# Patient Record
Sex: MALE | Race: BLACK OR AFRICAN AMERICAN | NOT HISPANIC OR LATINO | URBAN - METROPOLITAN AREA
[De-identification: names, ages, dates, MRNs, and addresses within clinical notes are randomized per-mention and may not be internally consistent; named-entity substitution may affect disease eponyms.]

---

## 2018-05-22 ENCOUNTER — OUTPATIENT (OUTPATIENT)
Dept: OUTPATIENT SERVICES | Facility: HOSPITAL | Age: 83
LOS: 1 days | End: 2018-05-22
Payer: MEDICARE

## 2018-05-22 VITALS
TEMPERATURE: 97 F | SYSTOLIC BLOOD PRESSURE: 172 MMHG | HEIGHT: 66 IN | DIASTOLIC BLOOD PRESSURE: 82 MMHG | RESPIRATION RATE: 14 BRPM | WEIGHT: 164.02 LBS | HEART RATE: 79 BPM

## 2018-05-22 DIAGNOSIS — K40.90 UNILATERAL INGUINAL HERNIA, WITHOUT OBSTRUCTION OR GANGRENE, NOT SPECIFIED AS RECURRENT: ICD-10-CM

## 2018-05-22 DIAGNOSIS — Z96.649 PRESENCE OF UNSPECIFIED ARTIFICIAL HIP JOINT: Chronic | ICD-10-CM

## 2018-05-22 LAB
BUN SERPL-MCNC: 20 MG/DL — SIGNIFICANT CHANGE UP (ref 7–23)
CALCIUM SERPL-MCNC: 8.7 MG/DL — SIGNIFICANT CHANGE UP (ref 8.4–10.5)
CHLORIDE SERPL-SCNC: 100 MMOL/L — SIGNIFICANT CHANGE UP (ref 98–107)
CO2 SERPL-SCNC: 22 MMOL/L — SIGNIFICANT CHANGE UP (ref 22–31)
CREAT SERPL-MCNC: 1.73 MG/DL — HIGH (ref 0.5–1.3)
GLUCOSE SERPL-MCNC: 88 MG/DL — SIGNIFICANT CHANGE UP (ref 70–99)
HCT VFR BLD CALC: 31.2 % — LOW (ref 39–50)
HGB BLD-MCNC: 9 G/DL — LOW (ref 13–17)
MCHC RBC-ENTMCNC: 18.6 PG — LOW (ref 27–34)
MCHC RBC-ENTMCNC: 28.8 % — LOW (ref 32–36)
MCV RBC AUTO: 64.3 FL — LOW (ref 80–100)
NRBC # FLD: 0.02 — SIGNIFICANT CHANGE UP
PLATELET # BLD AUTO: 401 K/UL — HIGH (ref 150–400)
PMV BLD: 9.2 FL — SIGNIFICANT CHANGE UP (ref 7–13)
POTASSIUM SERPL-MCNC: 4.7 MMOL/L — SIGNIFICANT CHANGE UP (ref 3.5–5.3)
POTASSIUM SERPL-SCNC: 4.7 MMOL/L — SIGNIFICANT CHANGE UP (ref 3.5–5.3)
RBC # BLD: 4.85 M/UL — SIGNIFICANT CHANGE UP (ref 4.2–5.8)
RBC # FLD: 18.8 % — HIGH (ref 10.3–14.5)
SODIUM SERPL-SCNC: 135 MMOL/L — SIGNIFICANT CHANGE UP (ref 135–145)
WBC # BLD: 6.3 K/UL — SIGNIFICANT CHANGE UP (ref 3.8–10.5)
WBC # FLD AUTO: 6.3 K/UL — SIGNIFICANT CHANGE UP (ref 3.8–10.5)

## 2018-05-22 PROCEDURE — 93010 ELECTROCARDIOGRAM REPORT: CPT

## 2018-05-22 NOTE — H&P PST ADULT - NEGATIVE CARDIOVASCULAR SYMPTOMS
no dyspnea on exertion/no orthopnea/no paroxysmal nocturnal dyspnea/no peripheral edema/no chest pain/no palpitations/no claudication

## 2018-05-22 NOTE — H&P PST ADULT - PROBLEM SELECTOR PLAN 1
Pt scheduled for right inguinal scrotal hernia repair with mesh on 5/31/2018.  labs done results pending, ekg done.  Pt with multiple comorbidities, went for medical evaluation with request.  Preop teaching done, pt able to verbalize understanding.

## 2018-05-22 NOTE — H&P PST ADULT - GASTROINTESTINAL DETAILS
no bruit/no rebound tenderness/no rigidity/no guarding/bowel sounds normal/nontender/no organomegaly/soft

## 2018-05-22 NOTE — H&P PST ADULT - RS GEN PE MLT RESP DETAILS PC
clear to auscultation bilaterally/respirations non-labored/breath sounds equal/good air movement/no chest wall tenderness/no intercostal retractions/no rales/no wheezes/no rhonchi

## 2018-05-22 NOTE — H&P PST ADULT - HISTORY OF PRESENT ILLNESS
82y/o male scheduled for right inguinal scrotal hernia repair with mesh on 5/31/2018.  Pt states, "right inguinal hernia for the past year, denies pain."

## 2018-05-22 NOTE — H&P PST ADULT - NEGATIVE GENERAL GENITOURINARY SYMPTOMS
no flank pain L/no dysuria/no hematuria/no flank pain R/no bladder infections/no urinary hesitancy/normal urinary frequency

## 2018-05-22 NOTE — H&P PST ADULT - NSANTHOSAYNRD_GEN_A_CORE
No. RASHID screening performed.  STOP BANG Legend: 0-2 = LOW Risk; 3-4 = INTERMEDIATE Risk; 5-8 = HIGH Risk

## 2018-05-22 NOTE — H&P PST ADULT - NEGATIVE GENERAL SYMPTOMS
no fever/no polyphagia/no fatigue/no sweating/no chills/no polydipsia/no anorexia/no weight loss/no weight gain/no polyuria/no malaise

## 2018-05-22 NOTE — H&P PST ADULT - NEGATIVE BREAST SYMPTOMS
no nipple discharge R/no breast lump R/no nipple discharge L/no breast tenderness L/no breast tenderness R/no breast lump L

## 2018-05-31 ENCOUNTER — OUTPATIENT (OUTPATIENT)
Dept: OUTPATIENT SERVICES | Facility: HOSPITAL | Age: 83
LOS: 1 days | Discharge: HOME | End: 2018-05-31
Payer: MEDICARE

## 2018-05-31 VITALS
DIASTOLIC BLOOD PRESSURE: 75 MMHG | TEMPERATURE: 99 F | HEART RATE: 59 BPM | OXYGEN SATURATION: 99 % | SYSTOLIC BLOOD PRESSURE: 157 MMHG | RESPIRATION RATE: 16 BRPM

## 2018-05-31 VITALS
RESPIRATION RATE: 18 BRPM | SYSTOLIC BLOOD PRESSURE: 159 MMHG | DIASTOLIC BLOOD PRESSURE: 87 MMHG | OXYGEN SATURATION: 100 % | WEIGHT: 164.02 LBS | TEMPERATURE: 99 F | HEART RATE: 62 BPM

## 2018-05-31 DIAGNOSIS — K40.90 UNILATERAL INGUINAL HERNIA, WITHOUT OBSTRUCTION OR GANGRENE, NOT SPECIFIED AS RECURRENT: ICD-10-CM

## 2018-05-31 DIAGNOSIS — Z96.649 PRESENCE OF UNSPECIFIED ARTIFICIAL HIP JOINT: Chronic | ICD-10-CM

## 2018-05-31 PROCEDURE — 88302 TISSUE EXAM BY PATHOLOGIST: CPT | Mod: 26

## 2018-05-31 RX ORDER — SODIUM CHLORIDE 9 MG/ML
1000 INJECTION, SOLUTION INTRAVENOUS
Qty: 0 | Refills: 0 | Status: DISCONTINUED | OUTPATIENT
Start: 2018-05-31 | End: 2018-06-01

## 2018-05-31 NOTE — ASU DISCHARGE PLAN (ADULT/PEDIATRIC). - NURSING INSTRUCTIONS
You were given 1000mg IV Tylenol for pain management.  Please DO NOT take Tylenol, Vicodin or Percocet for the next 6 hours (until 3 pm ).  DO NOT EXCEED 3000MG OF TYLENOL OVER 24 HOURS.

## 2018-05-31 NOTE — ASU DISCHARGE PLAN (ADULT/PEDIATRIC). - MEDICATION SUMMARY - MEDICATIONS TO TAKE
I will START or STAY ON the medications listed below when I get home from the hospital:    Tylenol 500 mg oral tablet  -- 2 tab(s) by mouth every 6 hours, As Needed  -- Indication: For Unilateral inguinal hernia without obstruction or gangrene    ibuprofen 800 mg oral tablet  -- 1 tab(s) by mouth once a day last dose 5/22/2018  -- Indication: For Unilateral inguinal hernia without obstruction or gangrene    aspirin 81 mg oral tablet  -- 1 tab(s) by mouth once a day  -- Indication: For Unilateral inguinal hernia without obstruction or gangrene    valsartan 320 mg oral tablet  -- 1 tab(s) by mouth once a day am  -- Indication: For Unilateral inguinal hernia without obstruction or gangrene    Metoprolol Tartrate 100 mg oral tablet  -- 1 tab(s) by mouth 2 times a day  -- Indication: For Unilateral inguinal hernia without obstruction or gangrene    omeprazole 20 mg oral delayed release tablet  -- 1 tab(s) by mouth once a day am  -- Indication: For Unilateral inguinal hernia without obstruction or gangrene

## 2018-05-31 NOTE — ASU DISCHARGE PLAN (ADULT/PEDIATRIC). - NOTIFY
Numbness, color, or temperature change to extremity/Bleeding that does not stop/Fever greater than 101/Pain not relieved by Medications Pain not relieved by Medications/Fever greater than 101/Numbness, color, or temperature change to extremity/Inability to Tolerate Liquids or Foods/Bleeding that does not stop/Persistent Nausea and Vomiting/Unable to Urinate

## 2018-05-31 NOTE — BRIEF OPERATIVE NOTE - PROCEDURE
<<-----Click on this checkbox to enter Procedure Inguinal hernia repair, right  05/31/2018  with mesh, underlay  Active  [id number]

## 2018-06-05 LAB — SURGICAL PATHOLOGY STUDY: SIGNIFICANT CHANGE UP

## 2020-02-18 ENCOUNTER — EMERGENCY (EMERGENCY)
Facility: HOSPITAL | Age: 85
LOS: 1 days | Discharge: HOME | End: 2020-02-18
Attending: EMERGENCY MEDICINE | Admitting: EMERGENCY MEDICINE
Payer: MEDICARE

## 2020-02-18 VITALS
RESPIRATION RATE: 18 BRPM | SYSTOLIC BLOOD PRESSURE: 165 MMHG | TEMPERATURE: 98 F | OXYGEN SATURATION: 100 % | DIASTOLIC BLOOD PRESSURE: 93 MMHG | HEART RATE: 97 BPM

## 2020-02-18 DIAGNOSIS — Z96.649 PRESENCE OF UNSPECIFIED ARTIFICIAL HIP JOINT: Chronic | ICD-10-CM

## 2020-02-18 LAB
ALBUMIN SERPL ELPH-MCNC: 3.8 G/DL — SIGNIFICANT CHANGE UP (ref 3.3–5)
ALP SERPL-CCNC: 44 U/L — SIGNIFICANT CHANGE UP (ref 40–120)
ALT FLD-CCNC: 7 U/L — SIGNIFICANT CHANGE UP (ref 4–41)
ANION GAP SERPL CALC-SCNC: 12 MMO/L — SIGNIFICANT CHANGE UP (ref 7–14)
ANISOCYTOSIS BLD QL: SIGNIFICANT CHANGE UP
APPEARANCE UR: CLEAR — SIGNIFICANT CHANGE UP
AST SERPL-CCNC: 35 U/L — SIGNIFICANT CHANGE UP (ref 4–40)
BASOPHILS # BLD AUTO: 0.02 K/UL — SIGNIFICANT CHANGE UP (ref 0–0.2)
BASOPHILS NFR BLD AUTO: 0.2 % — SIGNIFICANT CHANGE UP (ref 0–2)
BASOPHILS NFR SPEC: 0 % — SIGNIFICANT CHANGE UP (ref 0–2)
BILIRUB SERPL-MCNC: 0.4 MG/DL — SIGNIFICANT CHANGE UP (ref 0.2–1.2)
BILIRUB UR-MCNC: NEGATIVE — SIGNIFICANT CHANGE UP
BLASTS # FLD: 0 % — SIGNIFICANT CHANGE UP (ref 0–0)
BLD GP AB SCN SERPL QL: NEGATIVE — SIGNIFICANT CHANGE UP
BLOOD UR QL VISUAL: NEGATIVE — SIGNIFICANT CHANGE UP
BUN SERPL-MCNC: 18 MG/DL — SIGNIFICANT CHANGE UP (ref 7–23)
CALCIUM SERPL-MCNC: 9.1 MG/DL — SIGNIFICANT CHANGE UP (ref 8.4–10.5)
CHLORIDE SERPL-SCNC: 98 MMOL/L — SIGNIFICANT CHANGE UP (ref 98–107)
CO2 SERPL-SCNC: 21 MMOL/L — LOW (ref 22–31)
COLOR SPEC: YELLOW — SIGNIFICANT CHANGE UP
CREAT SERPL-MCNC: 1.51 MG/DL — HIGH (ref 0.5–1.3)
EOSINOPHIL # BLD AUTO: 0.18 K/UL — SIGNIFICANT CHANGE UP (ref 0–0.5)
EOSINOPHIL NFR BLD AUTO: 2.2 % — SIGNIFICANT CHANGE UP (ref 0–6)
EOSINOPHIL NFR FLD: 2.6 % — SIGNIFICANT CHANGE UP (ref 0–6)
GIANT PLATELETS BLD QL SMEAR: PRESENT — SIGNIFICANT CHANGE UP
GLUCOSE SERPL-MCNC: 100 MG/DL — HIGH (ref 70–99)
GLUCOSE UR-MCNC: NEGATIVE — SIGNIFICANT CHANGE UP
HCT VFR BLD CALC: 25.3 % — LOW (ref 39–50)
HGB BLD-MCNC: 7 G/DL — CRITICAL LOW (ref 13–17)
HYPOCHROMIA BLD QL: SIGNIFICANT CHANGE UP
IMM GRANULOCYTES NFR BLD AUTO: 0.4 % — SIGNIFICANT CHANGE UP (ref 0–1.5)
KETONES UR-MCNC: NEGATIVE — SIGNIFICANT CHANGE UP
LEUKOCYTE ESTERASE UR-ACNC: NEGATIVE — SIGNIFICANT CHANGE UP
LIDOCAIN IGE QN: 40.3 U/L — SIGNIFICANT CHANGE UP (ref 7–60)
LYMPHOCYTES # BLD AUTO: 1.84 K/UL — SIGNIFICANT CHANGE UP (ref 1–3.3)
LYMPHOCYTES # BLD AUTO: 22.3 % — SIGNIFICANT CHANGE UP (ref 13–44)
LYMPHOCYTES NFR SPEC AUTO: 20.7 % — SIGNIFICANT CHANGE UP (ref 13–44)
MACROCYTES BLD QL: SLIGHT — SIGNIFICANT CHANGE UP
MCHC RBC-ENTMCNC: 15.8 PG — LOW (ref 27–34)
MCHC RBC-ENTMCNC: 27.7 % — LOW (ref 32–36)
MCV RBC AUTO: 57.2 FL — LOW (ref 80–100)
METAMYELOCYTES # FLD: 0 % — SIGNIFICANT CHANGE UP (ref 0–1)
MICROCYTES BLD QL: SIGNIFICANT CHANGE UP
MONOCYTES # BLD AUTO: 1.23 K/UL — HIGH (ref 0–0.9)
MONOCYTES NFR BLD AUTO: 14.9 % — HIGH (ref 2–14)
MONOCYTES NFR BLD: 10.3 % — HIGH (ref 2–9)
MYELOCYTES NFR BLD: 0 % — SIGNIFICANT CHANGE UP (ref 0–0)
NEUTROPHIL AB SER-ACNC: 66.4 % — SIGNIFICANT CHANGE UP (ref 43–77)
NEUTROPHILS # BLD AUTO: 4.94 K/UL — SIGNIFICANT CHANGE UP (ref 1.8–7.4)
NEUTROPHILS NFR BLD AUTO: 60 % — SIGNIFICANT CHANGE UP (ref 43–77)
NEUTS BAND # BLD: 0 % — SIGNIFICANT CHANGE UP (ref 0–6)
NITRITE UR-MCNC: NEGATIVE — SIGNIFICANT CHANGE UP
NRBC # BLD: 1 /100WBC — SIGNIFICANT CHANGE UP
NRBC # FLD: 0.03 K/UL — SIGNIFICANT CHANGE UP (ref 0–0)
NT-PROBNP SERPL-SCNC: 201.4 PG/ML — SIGNIFICANT CHANGE UP
OB PNL STL: NEGATIVE — SIGNIFICANT CHANGE UP
OTHER - HEMATOLOGY %: 0 — SIGNIFICANT CHANGE UP
PH UR: 7.5 — SIGNIFICANT CHANGE UP (ref 5–8)
PLATELET # BLD AUTO: 515 K/UL — HIGH (ref 150–400)
PLATELET COUNT - ESTIMATE: SIGNIFICANT CHANGE UP
PMV BLD: 9 FL — SIGNIFICANT CHANGE UP (ref 7–13)
POIKILOCYTOSIS BLD QL AUTO: SIGNIFICANT CHANGE UP
POLYCHROMASIA BLD QL SMEAR: SLIGHT — SIGNIFICANT CHANGE UP
POTASSIUM SERPL-MCNC: 5.3 MMOL/L — SIGNIFICANT CHANGE UP (ref 3.5–5.3)
POTASSIUM SERPL-SCNC: 5.3 MMOL/L — SIGNIFICANT CHANGE UP (ref 3.5–5.3)
PROMYELOCYTES # FLD: 0 % — SIGNIFICANT CHANGE UP (ref 0–0)
PROT SERPL-MCNC: 7.7 G/DL — SIGNIFICANT CHANGE UP (ref 6–8.3)
PROT UR-MCNC: 10 — SIGNIFICANT CHANGE UP
RBC # BLD: 4.42 M/UL — SIGNIFICANT CHANGE UP (ref 4.2–5.8)
RBC # FLD: 22.5 % — HIGH (ref 10.3–14.5)
RH IG SCN BLD-IMP: POSITIVE — SIGNIFICANT CHANGE UP
RH IG SCN BLD-IMP: POSITIVE — SIGNIFICANT CHANGE UP
SCHISTOCYTES BLD QL AUTO: SLIGHT — SIGNIFICANT CHANGE UP
SODIUM SERPL-SCNC: 131 MMOL/L — LOW (ref 135–145)
SP GR SPEC: 1.01 — SIGNIFICANT CHANGE UP (ref 1–1.04)
TARGETS BLD QL SMEAR: SIGNIFICANT CHANGE UP
TROPONIN T, HIGH SENSITIVITY: 15 NG/L — SIGNIFICANT CHANGE UP (ref ?–14)
TROPONIN T, HIGH SENSITIVITY: 17 NG/L — SIGNIFICANT CHANGE UP (ref ?–14)
UROBILINOGEN FLD QL: NORMAL — SIGNIFICANT CHANGE UP
VARIANT LYMPHS # BLD: 0 % — SIGNIFICANT CHANGE UP
WBC # BLD: 8.24 K/UL — SIGNIFICANT CHANGE UP (ref 3.8–10.5)
WBC # FLD AUTO: 8.24 K/UL — SIGNIFICANT CHANGE UP (ref 3.8–10.5)

## 2020-02-18 PROCEDURE — 71046 X-RAY EXAM CHEST 2 VIEWS: CPT | Mod: 26

## 2020-02-18 PROCEDURE — 99218: CPT | Mod: GC

## 2020-02-18 PROCEDURE — ZZZZZ: CPT

## 2020-02-18 RX ORDER — FAMOTIDINE 10 MG/ML
20 INJECTION INTRAVENOUS ONCE
Refills: 0 | Status: COMPLETED | OUTPATIENT
Start: 2020-02-18 | End: 2020-02-18

## 2020-02-18 RX ADMIN — FAMOTIDINE 20 MILLIGRAM(S): 10 INJECTION INTRAVENOUS at 20:53

## 2020-02-18 NOTE — ED PROVIDER NOTE - OBJECTIVE STATEMENT
84 y/o male hx GERD HTN presents to ER w/ multiple complaints. Pt. states for the past 2 months has been experiencing chest pain/burning and difficulty swallowing - states he has been off his omeprazole for the past 2-3 months and thinks this is contributing to his symptoms- states he has difficulty swallowing solids but able to tolerate liquids. also admits to intermittetn cough as well. Deneis fever chills weakness dizziness nausea vomit.

## 2020-02-18 NOTE — ED CDU PROVIDER INITIAL DAY NOTE - OBJECTIVE STATEMENT
HPI: Patient is a 85 y.o male with Pmhx of HTN, GERD who presents to ED c/o vague chest discomfort described as burning sensation and feeling of SOB today s/p walking home from bus. As per patient and daughter at bedside states that over past month or so he has been having intermittent burning sensation of chest, a/w eating. Over past few days though noted some generalized weakness and sob. However symptoms worse today, states he went out to store and after walking home from bus he was very sob prompting them to come to ED.  Pt noted to be anemic in ED, HGB 7.0. Pt has never required transfusion in past but states was told recently he was anemic and needed to follow up with a hematologist. Pt labs otherwise wnl. Trops stable/neg x 2. CXR normal. Pt Transferred to CDU for 2 units PRBC, vitals q4 and frequent re-evals. Pt denies recent travel, melena, hematuria, fevers, chills, back pain, LE swelling or any other complaints. Not on blood thinners.

## 2020-02-18 NOTE — ED PROVIDER NOTE - PROGRESS NOTE DETAILS
patient initially presneted with ?exertional cp. given low hgb, will transfuse to target of 9. patient well appearing with vss and in no distress. will also try to get heme consult while in CDU

## 2020-02-18 NOTE — ED CDU PROVIDER INITIAL DAY NOTE - ATTENDING CONTRIBUTION TO CARE
I performed a face to face evaluation of this patient and obtained a history and performed a full exam.  I agree with the history, physical exam and plan of the PA. Exam unremarkable.

## 2020-02-18 NOTE — ED CDU PROVIDER INITIAL DAY NOTE - PHYSICAL EXAMINATION
Vital signs reviewed.   CONSTITUTIONAL: Well-appearing; well-nourished; in no apparent distress. Non-toxic appearing.   HEAD: Normocephalic, atraumatic.  EYES: PERRL, EOM intact, conjunctiva and sclera WNL.  ENT: normal nose; no rhinorrhea;  NECK/LYMPH: Supple; non-tender  CARD: Normal S1, S2; no murmurs, rubs, or gallops noted.  RESP: Normal chest excursion with respiration; breath sounds clear and equal bilaterally; no wheezes, rhonchi, or rales.  EXT/MS: moves all extremities;  no pedal edema.  SKIN: Normal for age and race; warm; dry; good turgor; no apparent lesions or exudate noted.  NEURO: Awake, alert, oriented x 3, no gross deficits  PSYCH: Normal mood; appropriate affect.

## 2020-02-18 NOTE — ED ADULT NURSE NOTE - OBJECTIVE STATEMENT
85 year old male with PMH of GERD and HTN presents with chest discomfort x several days with SOB  Pt is alert and oriented x4, [place] speaking, daughter at the bedside helping to translate with the history. Cardiac monitor on SR noted PIV placed labs drawn and sent as ordered plan of care discussed with pt and daughter will continue to monitor closely

## 2020-02-18 NOTE — ED ADULT NURSE NOTE - NSIMPLEMENTINTERV_GEN_ALL_ED
Implemented All Fall with Harm Risk Interventions:  [place] to call system. Call bell, personal items and telephone within reach. Instruct patient to call for assistance. Room bathroom lighting operational. Non-slip footwear when patient is off stretcher. Physically safe environment: no spills, clutter or unnecessary equipment. Stretcher in lowest position, wheels locked, appropriate side rails in place. Provide visual cue, wrist band, yellow gown, etc. Monitor gait and stability. Monitor for mental status changes and reorient to person, place, and time. Review medications for side effects contributing to fall risk. Reinforce activity limits and safety measures with patient and family. Provide visual clues: red socks.

## 2020-02-18 NOTE — ED ADULT NURSE NOTE - CHPI ED NUR SYMPTOMS NEG
no chills/no dizziness/no diaphoresis/no nausea/no back pain/no congestion/no fever/no vomiting/no syncope

## 2020-02-18 NOTE — ED PROVIDER NOTE - ATTENDING CONTRIBUTION TO CARE
I have personally performed a face to face bedside history and physical examination of this patient. I have discussed the history, examination, review of systems, assessment and plan of management with the resident. I have reviewed the electronic medical record and amended it to reflect my history, review of systems, physical exam, assessment and plan.    84 y/o male hx GERD HTN presents to ER w/ multiple complaints. Pt. states for the past 2 months has been experiencing chest pain/burning and difficulty swallowing - states he has been off his omeprazole for the past 2-3 months and thinks this is contributing to his symptoms- states he has difficulty swallowing solids but able to tolerate liquids. also admits to intermittetn cough as well. Deneis fever chills weakness dizziness nausea vomit. VSS AF.  Exam non-toxic appearing, neuro exam non-focal, lungs clear, heart sounds nl, abdomen soft and nt.  Low c/f acute surgical abd pathology.  No clear bacterial infectious etiology.  Possible gerd given patient off ppi.  Will send labs, supportive care.  Disposition pending.

## 2020-02-18 NOTE — ED PROVIDER NOTE - CLINICAL SUMMARY MEDICAL DECISION MAKING FREE TEXT BOX
CHAI:  86 y/o male hx GERD HTN presents to ER w/ multiple complaints. Pt. states for the past 2 months has been experiencing chest pain/burning and difficulty swallowing - states he has been off his omeprazole for the past 2-3 months and thinks this is contributing to his symptoms- states he has difficulty swallowing solids but able to tolerate liquids. also admits to intermittetn cough as well. Deneis fever chills weakness dizziness nausea vomit. VSS AF.  Exam non-toxic appearing, neuro exam non-focal, lungs clear, heart sounds nl, abdomen soft and nt.  Low c/f acute surgical abd pathology.  No clear bacterial infectious etiology.  Possible gerd given patient off ppi.  Will send labs, supportive care.  Disposition pending.

## 2020-02-18 NOTE — ED CDU PROVIDER INITIAL DAY NOTE - MEDICAL DECISION MAKING DETAILS
Patient is a 85 y.o male with Pmhx of HTN, GERD who presents to ED c/o vague chest discomfort described as burning sensation and feeling of SOB today s/p walking home from bus. Pt noted to be anemic in ED, HGB 7.0. Pt has never required transfusion in past. Transferred to CDU for 2 units PRBC, vitals q4 and frequent re-evals.

## 2020-02-18 NOTE — ED CDU PROVIDER INITIAL DAY NOTE - DETAILS
Patient is a 85 y.o male with Pmhx of HTN, GERD who presents to ED c/o vague chest discomfort described as burning sensation and feeling of SOB today s/p walking home from bus. Pt noted to be anemic in ED, HGB 7.0. Pt has never required transfusion in past. Transferred to CDU for 2 units PRBC Patient is a 85 y.o male with Pmhx of HTN, GERD who presents to ED c/o vague chest discomfort described as burning sensation and feeling of SOB today s/p walking home from bus. Pt noted to be anemic in ED, HGB 7.0. Pt has never required transfusion in past. Transferred to CDU for 2 units PRBC, vitals q4 and frequent re-evals.

## 2020-02-19 VITALS
OXYGEN SATURATION: 99 % | SYSTOLIC BLOOD PRESSURE: 144 MMHG | DIASTOLIC BLOOD PRESSURE: 84 MMHG | HEART RATE: 73 BPM | RESPIRATION RATE: 18 BRPM

## 2020-02-19 PROBLEM — I10 ESSENTIAL (PRIMARY) HYPERTENSION: Chronic | Status: ACTIVE | Noted: 2018-05-22

## 2020-02-19 PROBLEM — K21.9 GASTRO-ESOPHAGEAL REFLUX DISEASE WITHOUT ESOPHAGITIS: Chronic | Status: ACTIVE | Noted: 2018-05-22

## 2020-02-19 LAB
HCT VFR BLD CALC: 29.9 % — LOW (ref 39–50)
HGB BLD-MCNC: 8.8 G/DL — LOW (ref 13–17)
MCHC RBC-ENTMCNC: 18 PG — LOW (ref 27–34)
MCHC RBC-ENTMCNC: 29.4 % — LOW (ref 32–36)
MCV RBC AUTO: 61.3 FL — LOW (ref 80–100)
NRBC # FLD: 0.02 K/UL — SIGNIFICANT CHANGE UP (ref 0–0)
PLATELET # BLD AUTO: 456 K/UL — HIGH (ref 150–400)
PMV BLD: 8.4 FL — SIGNIFICANT CHANGE UP (ref 7–13)
RBC # BLD: 4.88 M/UL — SIGNIFICANT CHANGE UP (ref 4.2–5.8)
RBC # FLD: 27.1 % — HIGH (ref 10.3–14.5)
SPECIMEN SOURCE: SIGNIFICANT CHANGE UP
WBC # BLD: 6.61 K/UL — SIGNIFICANT CHANGE UP (ref 3.8–10.5)
WBC # FLD AUTO: 6.61 K/UL — SIGNIFICANT CHANGE UP (ref 3.8–10.5)

## 2020-02-19 PROCEDURE — 99217: CPT

## 2020-02-19 PROCEDURE — 93306 TTE W/DOPPLER COMPLETE: CPT | Mod: 26

## 2020-02-19 NOTE — ED CDU PROVIDER SUBSEQUENT DAY NOTE - PROGRESS NOTE DETAILS
Patient signed out to me to f/u ECHO and repeat H/H after transfusion. Hg 8.8 improved. Patient reassessed, sitting comfortably in bed in NAD, denies any complaints. States feeling better, symptoms improved. Echo shows severe pulmonary hypertension. discussed with attending, pulmonary consulted. Will continue to monitor and reassess.

## 2020-02-19 NOTE — ED CDU PROVIDER SUBSEQUENT DAY NOTE - HISTORY
Patient is a 85 y.o male with Pmhx of HTN, GERD who presents to ED c/o vague chest discomfort described as burning sensation and feeling of SOB today s/p walking home from bus. As per patient and daughter at bedside states that over past month or so he has been having intermittent burning sensation of chest, a/w eating. Over past few days though noted some generalized weakness and sob. However symptoms worse today, states he went out to store and after walking home from bus he was very sob prompting them to come to ED.  Pt noted to be anemic in ED, HGB 7.0. Pt has never required transfusion in past but states was told recently he was anemic and needed to follow up with a hematologist. Pt labs otherwise wnl. Trops stable/neg x 2. CXR normal. Pt Transferred to CDU for 2 units PRBC, vitals q4 and frequent re-evals. Pt denies recent travel, melena, hematuria, fevers, chills, back pain, LE swelling or any other complaints. Not on blood thinners.    In interim- Patient resting comfortably, no complaints. Receiving 2nd unit PRBC. Will plan for repeat AM labs. No acute events overnight.

## 2020-02-19 NOTE — ED CDU PROVIDER DISPOSITION NOTE - NSFOLLOWUPINSTRUCTIONS_ED_ALL_ED_FT
Rest, drink plenty of fluids.  Advance activity as tolerated.  Continue all previously prescribed medications as directed.  Follow up with your primary care physician, pulmonary and cardiologist in 48-72 hours- bring copies of your results. Call on referral list given for next available appointment. Return to the ER for worsening or persistent symptoms, and/or ANY NEW OR CONCERNING SYMPTOMS. If you have issues obtaining follow up, please call: [phone] to obtain a doctor or specialist who takes your insurance in your area.     Pulmonary clinic: Address  [address] [facility] [address], [address] [place] [id number].   [phone]

## 2020-02-19 NOTE — CONSULT NOTE ADULT - ATTENDING COMMENTS
85 year old male with history of GERD and HTN p/w chest pain. Echo showed  evidence of high PASP but RV size was ok and bnp was also ok. Unclear etiology of pHTN- unlikely group 3 at this point or group 1 given lack of systemic sx and age. May be group 2 secondary to diastolic dysfunction. However needs full w/u to evaluate for etiologies of pHTN and poss treatment. Pt currently asx and pt prefers to go home. Instructed to family to have close f/u in clinic for proper w/u and tx. ER team informed of plan.

## 2020-02-19 NOTE — CONSULT NOTE ADULT - SUBJECTIVE AND OBJECTIVE BOX
CHIEF COMPLAINT:  Chest pain x 2 months    HPI:  85 year old male with history of GERD and HTN presented with chest pain for past two months. Recently stopped taking his PPI. Has intermittent cough and dyspnea. Currently without symptoms. Was ruled out for ACS. Had negative troponins and low pBNP. Echocardiogram showed elevated PASP. Pulmonary consulted to assess patient given echo findings. Patient has no specific complaints at this time, saturating well on room air. Spoke with patients daughter who says that patient has some dyspnea at times. Patient denies skin changes or rash, lower extremity swelling, joint pain, fevers, or weight loss.    PAST MEDICAL & SURGICAL HISTORY:  GERD (gastroesophageal reflux disease)  Hypertension  Inguinal hernia  S/P hip replacement: right       FAMILY HISTORY:      SOCIAL HISTORY:  Smoking: [x] Never Smoked [ ] Former Smoker (__ packs x ___ years) [ ] Current Smoker  (__ packs x ___ years)  Substance Use: [x] Never Used [ ] Used ____  EtOH Use: Denies  Marital Status: [ ] Single [ ]  [ ]  [ ]   Sexual History:   Occupation:  Recent Travel:  Country of Birth: [facility]  Advance Directives:    Allergies    No Known Allergies    Intolerances        HOME MEDICATIONS:    REVIEW OF SYSTEMS:  Constitutional: [ ] negative [-] fevers [-] chills [ ] weight loss [ ] weight gain  HEENT: [ ] negative [ ] dry eyes [ ] eye irritation [ ] postnasal drip [ ] nasal congestion  CV: [ ] negative  [-] chest pain [-] orthopnea [-] palpitations [ ] murmur  Resp: [ ] negative [-] cough [-] shortness of breath [-] wheezing [-] sputum [-] hemoptysis  GI: [ ] negative [-] nausea [-] vomiting [-] diarrhea [-] constipation [-] abd pain [ ] dysphagia   : [ ] negative [-] dysuria [ ] nocturia [ ] hematuria [ ] increased urinary frequency  Musculoskeletal: [ ] negative [ ] back pain [-] myalgias [-] arthralgias [ ] fracture  Skin: [ ] negative [-] rash [ ] itch  Neurological: [ ] negative [-] headache [-] dizziness [ ] syncope [ ] weakness [ ] numbness  Psychiatric: [ ] negative [ ] anxiety [ ] depression  Endocrine: [ ] negative [ ] diabetes [ ] thyroid problem  Hematologic/Lymphatic: [ ] negative [ ] anemia [ ] bleeding problem  Allergic/Immunologic: [ ] negative [ ] itchy eyes [ ] nasal discharge [ ] hives [ ] angioedema  [ ] All other systems negative  [ ] Unable to assess ROS because ________    OBJECTIVE:  ICU Vital Signs Last 24 Hrs  T(C): 36.6 (2020 10:28), Max: 37.1 (2020 21:00)  T(F): 97.9 (2020 10:28), Max: 98.7 (2020 21:00)  HR: 73 (2020 13:43) (68 - 91)  BP: 144/84 (2020 13:43) (122/65 - 193/91)  BP(mean): --  ABP: --  ABP(mean): --  RR: 18 (2020 13:43) (15 - 18)  SpO2: 99% (2020 13:43) (98% - 100%)        CAPILLARY BLOOD GLUCOSE          PHYSICAL EXAM:  General: No apparent distress  HEENT: NC/AT  Neck: Supple  Respiratory: CTAB, no wheezing or crackles, good air entry  Cardiovascular: RRR, no murmur, no LE edema  Abdomen: Soft, nontender, nondistended  Extremities: Warm  Skin: Intact  Neurological: A&Ox3, no focal deficits  Psychiatry: Normal mood and affect    HOSPITAL MEDICATIONS:        LABS:                        8.8    6.61  )-----------( 456      ( 2020 08:00 )             29.9     Hgb Trend: 8.8<--, 7.0<--  0218    131<L>  |  98  |  18  ----------------------------<  100<H>  5.3   |  21<L>  |  1.51<H>    Ca    9.1      2020 18:45    TPro  7.7  /  Alb  3.8  /  TBili  0.4  /  DBili  x   /  AST  35  /  ALT  7   /  AlkPhos  44  02-18    Creatinine Trend: 1.51<--    Urinalysis Basic - ( 2020 23:00 )    Color: YELLOW / Appearance: CLEAR / S.012 / pH: 7.5  Gluc: NEGATIVE / Ketone: NEGATIVE  / Bili: NEGATIVE / Urobili: NORMAL   Blood: NEGATIVE / Protein: 10 / Nitrite: NEGATIVE   Leuk Esterase: NEGATIVE / RBC: x / WBC x   Sq Epi: x / Non Sq Epi: x / Bacteria: x            MICROBIOLOGY:     RADIOLOGY:  [ ] Reviewed and interpreted by me    ASSESSMENT AND RECOMMENDATION:    85 year old male with history of GERD and HTN presented with chest pain for past two months. Recently stopped taking his PPI. Has intermittent cough and dyspnea. Found to have elevated pulmonary pressures on echocardiogram. Echo also showed increased relative wall thickness. RV size and function is normal. Patient exhibits no signs or symptoms of systemic process that suggests non-cardiac etiology of elevated estimated pulmonary pressures. 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    Patient may follow up as outpatient   No requirement for further inpatient work up  Our office is located at [address] [place] [address], [address], [place] | [phone]

## 2020-02-20 LAB — BACTERIA UR CULT: SIGNIFICANT CHANGE UP

## 2020-05-22 NOTE — ED ADULT NURSE NOTE - WEIGHT IN KG
"Marilou Desai is a 47 year old female who is being evaluated via a billable video visit.      The patient has been notified of following:     \"This video visit will be conducted via a call between you and your physician/provider. We have found that certain health care needs can be provided without the need for an in-person physical exam.  This service lets us provide the care you need with a video conversation.  If a prescription is necessary we can send it directly to your pharmacy.  If lab work is needed we can place an order for that and you can then stop by our lab to have the test done at a later time.    Video visits are billed at different rates depending on your insurance coverage.  Please reach out to your insurance provider with any questions.    If during the course of the call the physician/provider feels a video visit is not appropriate, you will not be charged for this service.\"    Patient has given verbal consent for Video visit? Yes    How would you like to obtain your AVS? Mail a copy    Patient would like the video invitation sent by: Text to cell phone: [phone]    Will anyone else be joining your video visit? No    Subjective   Marilou Desai is a 47 year old female who presents today via video visit for the following health issues:    HPI     Feels Dehydrated, since last week. Couple fevers last week. Hard to breathe because mouth is dry. Went to ER on Wednesday. They gave her some IV fluids. ED note in chart. Was tested for Covid results are pending    Patient was initially seen in the ER couple days ago and was diagnosed with viral pneumonia.  She was not started on antibiotics.  She was tested for COVID-19 and was negative.  Patient states she has been feeling dehydrated, not able to eat or drink and has dry mouth.  Video Start Time: 1: 25  PM        Patient Active Problem List   Diagnosis     CARDIOVASCULAR SCREENING; LDL GOAL LESS THAN 160     History of hypertension     " Positive PPD     Neutropenia (H)     Benign essential hypertension     Past Surgical History:   Procedure Laterality Date     GYN SURGERY             Social History     Tobacco Use     Smoking status: Never Smoker     Smokeless tobacco: Never Used   Substance Use Topics     Alcohol use: Yes     Comment: occ     Family History   Problem Relation Age of Onset     GI problems Brother         hemorrhoid     No Known Problems Sister      No Known Problems Son      No Known Problems Daughter      No Known Problems Brother      No Known Problems Brother      No Known Problems Brother      No Known Problems Sister      No Known Problems Son      Glaucoma No family hx of      Macular Degeneration No family hx of          Current Outpatient Medications   Medication Sig Dispense Refill     acetaminophen (TYLENOL) 325 MG tablet Take 325-650 mg by mouth every 6 hours as needed for mild pain       vitamin C (ASCORBIC ACID) 100 MG tablet Take 100 mg by mouth 3 times daily       amLODIPine (NORVASC) 2.5 MG tablet Take 1 tablet (2.5 mg) by mouth daily 30 tablet 1     azithromycin (ZITHROMAX) 250 MG tablet        Pseudoephedrine-APAP-DM (DAYQUIL MULTI-SYMPTOM PO)        Allergies   Allergen Reactions     Metoprolol      weakness     Recent Labs   Lab Test 13  1501 12  1648   LDL 71  --    HDL 61  --    TRIG 136  --    CR 0.60 0.52   GFRESTIMATED >90 >90   GFRESTBLACK >90 >90   POTASSIUM 4.5 3.9      BP Readings from Last 3 Encounters:   20 (!) 147/98   19 130/80   13 116/87    Wt Readings from Last 3 Encounters:   20 64.2 kg (141 lb 9.6 oz)   19 65 kg (143 lb 3.2 oz)   13 62.1 kg (137 lb)                    Reviewed and updated as needed this visit by Provider         Review of Systems   Constitutional, HEENT, cardiovascular, pulmonary, gi and gu systems are negative, except as otherwise noted.      Objective    LMP 2018 (Within Weeks)   Estimated body mass index is 26.19  "kg/m  as calculated from the following:    Height as of 3/21/19: 1.575 m (5' 2\").    Weight as of 3/21/19: 65 kg (143 lb 3.2 oz).  Physical Exam             Assessment & Plan     1. Upper respiratory tract infection, unspecified type  Patient was initially seen in the ER couple days ago and was diagnosed with viral pneumonia.  She was not started on antibiotics.  She was tested for COVID-19 and was negative.  Patient states she has been feeling dehydrated, not able to eat or drink and has dry mouth.  Patient also reports cough and feeling weak.  Discussed with patient in details differential diagnosis.  Patient needs to be seen for physical exam and possible labs  Advised patient to go to the urgent care for further evaluation and management.    Patient verbalized understanding and agreed on the plan of care.  All questions answered.        No follow-ups on file.    Sotero Murray MD  [facility]      Video-Visit Details    Type of service:  Video Visit    Video End Time: 1:45 pm     Originating Location (pt. Location): Home    Distant Location (provider location):  [facility]     Platform used for Video Visit: Doxgordon    No follow-ups on file.       Sotero Murray MD        " 74.8

## 2020-10-20 NOTE — ED ADULT NURSE NOTE - PRIMARY CARE PROVIDER
Problem: VTE, Risk for  Goal: # No s/s of VTE  Outcome: Outcome Met, Continue evaluating goal progress toward completion  Note: Patient has no s/s of VTE at this time.        unk

## 2021-08-06 NOTE — H&P PST ADULT - NEGATIVE HEMATOLOGY SYMPTOMS
EUA was given today. This has been reviewed, questions answered, and verbal consent given by Patient for injection(s) and administration of COVID-19 pfizer.    Patient tolerated without incident. See immunization grid for documentation.     no gum bleeding/no skin lumps/no nose bleeding

## 2021-11-16 ENCOUNTER — OUTPATIENT (OUTPATIENT)
Dept: OUTPATIENT SERVICES | Facility: HOSPITAL | Age: 86
LOS: 1 days | End: 2021-11-16
Payer: MEDICARE

## 2021-11-16 VITALS
OXYGEN SATURATION: 98 % | WEIGHT: 139.99 LBS | HEIGHT: 63.5 IN | RESPIRATION RATE: 18 BRPM | TEMPERATURE: 97 F | SYSTOLIC BLOOD PRESSURE: 140 MMHG | DIASTOLIC BLOOD PRESSURE: 70 MMHG | HEART RATE: 88 BPM

## 2021-11-16 DIAGNOSIS — K40.90 UNILATERAL INGUINAL HERNIA, WITHOUT OBSTRUCTION OR GANGRENE, NOT SPECIFIED AS RECURRENT: ICD-10-CM

## 2021-11-16 DIAGNOSIS — G47.33 OBSTRUCTIVE SLEEP APNEA (ADULT) (PEDIATRIC): ICD-10-CM

## 2021-11-16 DIAGNOSIS — K21.9 GASTRO-ESOPHAGEAL REFLUX DISEASE WITHOUT ESOPHAGITIS: ICD-10-CM

## 2021-11-16 DIAGNOSIS — Z96.649 PRESENCE OF UNSPECIFIED ARTIFICIAL HIP JOINT: Chronic | ICD-10-CM

## 2021-11-16 DIAGNOSIS — Z98.890 OTHER SPECIFIED POSTPROCEDURAL STATES: Chronic | ICD-10-CM

## 2021-11-16 DIAGNOSIS — I10 ESSENTIAL (PRIMARY) HYPERTENSION: ICD-10-CM

## 2021-11-16 LAB
ANION GAP SERPL CALC-SCNC: 12 MMOL/L — SIGNIFICANT CHANGE UP (ref 7–14)
BUN SERPL-MCNC: 35 MG/DL — HIGH (ref 7–23)
CALCIUM SERPL-MCNC: 9 MG/DL — SIGNIFICANT CHANGE UP (ref 8.4–10.5)
CHLORIDE SERPL-SCNC: 103 MMOL/L — SIGNIFICANT CHANGE UP (ref 98–107)
CO2 SERPL-SCNC: 21 MMOL/L — LOW (ref 22–31)
CREAT SERPL-MCNC: 2.15 MG/DL — HIGH (ref 0.5–1.3)
GLUCOSE SERPL-MCNC: 119 MG/DL — HIGH (ref 70–99)
HCT VFR BLD CALC: 26.5 % — LOW (ref 39–50)
HGB BLD-MCNC: 7.5 G/DL — LOW (ref 13–17)
MCHC RBC-ENTMCNC: 17.7 PG — LOW (ref 27–34)
MCHC RBC-ENTMCNC: 28.3 GM/DL — LOW (ref 32–36)
MCV RBC AUTO: 62.6 FL — LOW (ref 80–100)
NRBC # BLD: 0 /100 WBCS — SIGNIFICANT CHANGE UP
NRBC # FLD: 0 K/UL — SIGNIFICANT CHANGE UP
PLATELET # BLD AUTO: 463 K/UL — HIGH (ref 150–400)
POTASSIUM SERPL-MCNC: 4.6 MMOL/L — SIGNIFICANT CHANGE UP (ref 3.5–5.3)
POTASSIUM SERPL-SCNC: 4.6 MMOL/L — SIGNIFICANT CHANGE UP (ref 3.5–5.3)
RBC # BLD: 4.23 M/UL — SIGNIFICANT CHANGE UP (ref 4.2–5.8)
RBC # FLD: 22.1 % — HIGH (ref 10.3–14.5)
SODIUM SERPL-SCNC: 136 MMOL/L — SIGNIFICANT CHANGE UP (ref 135–145)
WBC # BLD: 6.08 K/UL — SIGNIFICANT CHANGE UP (ref 3.8–10.5)
WBC # FLD AUTO: 6.08 K/UL — SIGNIFICANT CHANGE UP (ref 3.8–10.5)

## 2021-11-16 PROCEDURE — 93010 ELECTROCARDIOGRAM REPORT: CPT

## 2021-11-16 RX ORDER — VALSARTAN 80 MG/1
1 TABLET ORAL
Qty: 0 | Refills: 0 | DISCHARGE

## 2021-11-16 RX ORDER — ACETAMINOPHEN 500 MG
2 TABLET ORAL
Qty: 0 | Refills: 0 | DISCHARGE

## 2021-11-16 RX ORDER — METOPROLOL TARTRATE 50 MG
1 TABLET ORAL
Qty: 0 | Refills: 0 | DISCHARGE

## 2021-11-16 NOTE — H&P PST ADULT - HISTORY OF PRESENT ILLNESS
This is an 86 y.o. male with unilateral inguinal hernia without obstruction or gangrene not recurrent . Pt evaluated by Dr Vivas , now for surgery .

## 2021-11-16 NOTE — H&P PST ADULT - NSICDXPASTMEDICALHX_GEN_ALL_CORE_FT
PAST MEDICAL HISTORY:  GERD (gastroesophageal reflux disease)     Hypertension     Inguinal hernia

## 2021-11-16 NOTE — H&P PST ADULT - PROBLEM SELECTOR PLAN 1
Scheduled for right inguinal hernia repair with mesh   Preop instructions provided and patient verbalizes understanding.  Labs done and results pending.  Hibiclens provided with instructions and was signed by patient. Teach-back method was utilized to assess patient's understanding. Patient verbalized understanding.

## 2022-03-22 ENCOUNTER — OUTPATIENT (OUTPATIENT)
Dept: OUTPATIENT SERVICES | Facility: HOSPITAL | Age: 87
LOS: 1 days | End: 2022-03-22

## 2022-03-22 VITALS
WEIGHT: 145.06 LBS | TEMPERATURE: 98 F | SYSTOLIC BLOOD PRESSURE: 140 MMHG | DIASTOLIC BLOOD PRESSURE: 80 MMHG | OXYGEN SATURATION: 98 % | RESPIRATION RATE: 16 BRPM | HEIGHT: 64 IN | HEART RATE: 80 BPM

## 2022-03-22 DIAGNOSIS — Z96.649 PRESENCE OF UNSPECIFIED ARTIFICIAL HIP JOINT: Chronic | ICD-10-CM

## 2022-03-22 DIAGNOSIS — K40.90 UNILATERAL INGUINAL HERNIA, WITHOUT OBSTRUCTION OR GANGRENE, NOT SPECIFIED AS RECURRENT: ICD-10-CM

## 2022-03-22 DIAGNOSIS — Z98.890 OTHER SPECIFIED POSTPROCEDURAL STATES: Chronic | ICD-10-CM

## 2022-03-22 LAB
ALBUMIN SERPL ELPH-MCNC: 4.2 G/DL — SIGNIFICANT CHANGE UP (ref 3.3–5)
ALP SERPL-CCNC: 72 U/L — SIGNIFICANT CHANGE UP (ref 40–120)
ALT FLD-CCNC: 5 U/L — SIGNIFICANT CHANGE UP (ref 4–41)
ANION GAP SERPL CALC-SCNC: 13 MMOL/L — SIGNIFICANT CHANGE UP (ref 7–14)
AST SERPL-CCNC: 17 U/L — SIGNIFICANT CHANGE UP (ref 4–40)
BILIRUB SERPL-MCNC: 0.3 MG/DL — SIGNIFICANT CHANGE UP (ref 0.2–1.2)
BUN SERPL-MCNC: 43 MG/DL — HIGH (ref 7–23)
CALCIUM SERPL-MCNC: 9 MG/DL — SIGNIFICANT CHANGE UP (ref 8.4–10.5)
CHLORIDE SERPL-SCNC: 103 MMOL/L — SIGNIFICANT CHANGE UP (ref 98–107)
CO2 SERPL-SCNC: 23 MMOL/L — SIGNIFICANT CHANGE UP (ref 22–31)
CREAT SERPL-MCNC: 1.86 MG/DL — HIGH (ref 0.5–1.3)
EGFR: 35 ML/MIN/1.73M2 — LOW
GLUCOSE SERPL-MCNC: 45 MG/DL — CRITICAL LOW (ref 70–99)
HCT VFR BLD CALC: 42.5 % — SIGNIFICANT CHANGE UP (ref 39–50)
HGB BLD-MCNC: 14.2 G/DL — SIGNIFICANT CHANGE UP (ref 13–17)
MCHC RBC-ENTMCNC: 28 PG — SIGNIFICANT CHANGE UP (ref 27–34)
MCHC RBC-ENTMCNC: 33.4 GM/DL — SIGNIFICANT CHANGE UP (ref 32–36)
MCV RBC AUTO: 83.7 FL — SIGNIFICANT CHANGE UP (ref 80–100)
NRBC # BLD: 0 /100 WBCS — SIGNIFICANT CHANGE UP
NRBC # FLD: 0 K/UL — SIGNIFICANT CHANGE UP
PLATELET # BLD AUTO: 263 K/UL — SIGNIFICANT CHANGE UP (ref 150–400)
POTASSIUM SERPL-MCNC: 4.5 MMOL/L — SIGNIFICANT CHANGE UP (ref 3.5–5.3)
POTASSIUM SERPL-SCNC: 4.5 MMOL/L — SIGNIFICANT CHANGE UP (ref 3.5–5.3)
PROT SERPL-MCNC: 7.9 G/DL — SIGNIFICANT CHANGE UP (ref 6–8.3)
RBC # BLD: 5.08 M/UL — SIGNIFICANT CHANGE UP (ref 4.2–5.8)
RBC # FLD: 19.7 % — HIGH (ref 10.3–14.5)
SODIUM SERPL-SCNC: 139 MMOL/L — SIGNIFICANT CHANGE UP (ref 135–145)
WBC # BLD: 5.27 K/UL — SIGNIFICANT CHANGE UP (ref 3.8–10.5)
WBC # FLD AUTO: 5.27 K/UL — SIGNIFICANT CHANGE UP (ref 3.8–10.5)

## 2022-03-22 RX ORDER — IBUPROFEN 200 MG
1 TABLET ORAL
Qty: 0 | Refills: 0 | DISCHARGE

## 2022-03-22 NOTE — H&P PST ADULT - NSICDXPASTSURGICALHX_GEN_ALL_CORE_FT
PAST SURGICAL HISTORY:  History of back surgery "about 15 years ago" (2007)    S/P hernia repair left 2018    S/P hip replacement right 2004

## 2022-03-22 NOTE — H&P PST ADULT - ASSESSMENT
Pt. is an 88 yo Creole speaking male with a right inguinal hernia.  Pt. was initially scheduled for 11/2021.  Pt. had severe anemia causing a postponement of the surgery.  Pt. states he had a blood transfusion and "I feel so much better."  Informed pt. that once lab results are reviewed tomorrow that if he needs medical evaluation he will be contacted.

## 2022-03-22 NOTE — PROVIDER CONTACT NOTE (CRITICAL VALUE NOTIFICATION) - ACTION/TREATMENT ORDERED:
Spoke with Cinthia - daughter, pt will be seen at PCP's office for repeat glucose this PM. Spoke with Cinthia - daughter, pt will be seen at PCP's office for repeat glucose this PM. FS at PCP's office is 90 as per daughter, no further intervention was recommended by PCP.

## 2022-03-22 NOTE — PROVIDER CONTACT NOTE (CRITICAL VALUE NOTIFICATION) - BACKGROUND
86 yo male with hx of HTN, GERD presents to have PST evaluation for right inguinal hernia repair with mesh.

## 2022-03-22 NOTE — H&P PST ADULT - PROBLEM SELECTOR PLAN 1
Pt. is scheduled for a right inguinal hernia repair with mesh 3/31/22.  Pt. verbalized understanding of instructions.

## 2022-03-22 NOTE — H&P PST ADULT - ACTIVITY
"after therapy I sometimes go for a walk and cleaning in the backyard" which is a form of therapy for him

## 2022-03-22 NOTE — H&P PST ADULT - NSICDXPASTMEDICALHX_GEN_ALL_CORE_FT
PAST MEDICAL HISTORY:  GERD (gastroesophageal reflux disease)     Hypertension     Right inguinal hernia

## 2022-03-31 ENCOUNTER — OUTPATIENT (OUTPATIENT)
Dept: OUTPATIENT SERVICES | Facility: HOSPITAL | Age: 87
LOS: 1 days | Discharge: HOME | End: 2022-03-31
Payer: MEDICARE

## 2022-03-31 VITALS
HEIGHT: 64 IN | HEART RATE: 77 BPM | OXYGEN SATURATION: 100 % | WEIGHT: 145.06 LBS | RESPIRATION RATE: 16 BRPM | DIASTOLIC BLOOD PRESSURE: 79 MMHG | TEMPERATURE: 99 F | SYSTOLIC BLOOD PRESSURE: 140 MMHG

## 2022-03-31 VITALS — RESPIRATION RATE: 97 BRPM | HEART RATE: 80 BPM

## 2022-03-31 DIAGNOSIS — Z96.649 PRESENCE OF UNSPECIFIED ARTIFICIAL HIP JOINT: Chronic | ICD-10-CM

## 2022-03-31 DIAGNOSIS — K40.90 UNILATERAL INGUINAL HERNIA, WITHOUT OBSTRUCTION OR GANGRENE, NOT SPECIFIED AS RECURRENT: ICD-10-CM

## 2022-03-31 DIAGNOSIS — Z98.890 OTHER SPECIFIED POSTPROCEDURAL STATES: Chronic | ICD-10-CM

## 2022-03-31 LAB — GLUCOSE BLDC GLUCOMTR-MCNC: 76 MG/DL — SIGNIFICANT CHANGE UP (ref 70–99)

## 2022-03-31 PROCEDURE — 88302 TISSUE EXAM BY PATHOLOGIST: CPT | Mod: 26

## 2022-03-31 DEVICE — MESH HERNIA MARLEX 2 X 4": Type: IMPLANTABLE DEVICE | Site: RIGHT | Status: FUNCTIONAL

## 2022-03-31 RX ORDER — OXYCODONE HYDROCHLORIDE 5 MG/1
1 TABLET ORAL
Qty: 12 | Refills: 0
Start: 2022-03-31 | End: 2022-04-02

## 2022-03-31 RX ORDER — ACETAMINOPHEN 500 MG
975 TABLET ORAL EVERY 6 HOURS
Refills: 0 | Status: DISCONTINUED | OUTPATIENT
Start: 2022-03-31 | End: 2022-04-14

## 2022-03-31 RX ORDER — ACETAMINOPHEN 500 MG
3 TABLET ORAL
Qty: 0 | Refills: 0 | DISCHARGE
Start: 2022-03-31

## 2022-03-31 RX ORDER — IBUPROFEN 200 MG
1 TABLET ORAL
Qty: 0 | Refills: 0 | DISCHARGE

## 2022-03-31 RX ORDER — OMEPRAZOLE 10 MG/1
1 CAPSULE, DELAYED RELEASE ORAL
Qty: 0 | Refills: 0 | DISCHARGE

## 2022-03-31 RX ORDER — ASPIRIN/CALCIUM CARB/MAGNESIUM 324 MG
1 TABLET ORAL
Qty: 0 | Refills: 0 | DISCHARGE

## 2022-03-31 RX ORDER — OXYCODONE HYDROCHLORIDE 5 MG/1
5 TABLET ORAL EVERY 4 HOURS
Refills: 0 | Status: DISCONTINUED | OUTPATIENT
Start: 2022-03-31 | End: 2022-03-31

## 2022-03-31 RX ORDER — AMLODIPINE BESYLATE 2.5 MG/1
1 TABLET ORAL
Qty: 0 | Refills: 0 | DISCHARGE

## 2022-03-31 NOTE — BRIEF OPERATIVE NOTE - NSICDXBRIEFPROCEDURE_GEN_ALL_CORE_FT
PROCEDURES:  Open repair of inguinal hernia using mesh in adult 31-Mar-2022 11:33:31  Timbo Robison

## 2022-03-31 NOTE — ASU DISCHARGE PLAN (ADULT/PEDIATRIC) - CARE PROVIDER_API CALL
Trae Vivas)  Surgery  [address], [address]  [place] [address]  Phone: [phone]  Fax: [phone]  Follow Up Time: 1 week

## 2022-03-31 NOTE — ASU DISCHARGE PLAN (ADULT/PEDIATRIC) - PAIN MANAGEMENT
no change Take over the counter pain medication/Prescriptions electronically submitted to pharmacy from doctor's office

## 2022-03-31 NOTE — ASU DISCHARGE PLAN (ADULT/PEDIATRIC) - ASU DC SPECIAL INSTRUCTIONSFT
Please call Dr. Vivas to set up an appointment in the office for follow up in 1 week    Please keep the wound dry for the next 48 hours, after this time you may remove the outer dressing but please leave the inner strip dressing on.  You may shower with the inner strip dressing on, allow it to fall off on its own.      Please take 3 tylenol 325mg every 6 hours regardless if you feel pain or not.  If you are still in severe pain please take 1 oxycodone 5mg tablet no more frequently than once every 6 hours.      Please do not do any heavy lifting for the next 4 weeks at least.  You are at increased risk for another hernia in this time period and heavy lifting increases that risk.  Please don't lift anything heavier than 10 pounds.      Please wear your scrotal support sling until you see Dr. Vivas in the office. Please call Dr. Vivas to set up an appointment in the office for follow up in 1 week    Please keep the wound dry for the next 48 hours, after this time you may remove the outer dressing but please leave the inner strip dressing on.  You may shower with the inner strip dressing on, allow it to fall off on its own.      Please take 3 tylenol 325mg every 6 hours regardless if you feel pain or not.  If you are still in severe pain please take 1 oxycodone 5mg tablet no more frequently than once every 6 hours.      Please do not do any heavy lifting for the next 4 weeks at least.  You are at increased risk for another hernia in this time period and heavy lifting increases that risk.  Please don't lift anything heavier than 10 pounds.      Please wear your scrotal support sling until you see Dr. Vivas in the office.  tylenol given at 10 AM next dose 4 PM

## 2022-03-31 NOTE — BRIEF OPERATIVE NOTE - OPERATION/FINDINGS
Chronic appearing pantaloon inguinal hernia identified and  from cord structures.  Hernia sac opened, sigmoid colon noted, sac contents reduced and sac suture ligated.  Prolene mesh placed.  Inguinal canal closed followed by scarpas layer.  Skin closure with deep dermals followed by running sub q. Dressing with steristrips, 4x4, tegaderm.      Hemostasis achieved  Blood loss was minimal

## 2022-03-31 NOTE — ASU DISCHARGE PLAN (ADULT/PEDIATRIC) - NS MD DC FALL RISK RISK
For information on Fall & Injury Prevention, visit: [email or web address] OR  [email or web address] OR  https://www.cdc.gov/steadi/patient.html

## 2022-03-31 NOTE — ASU PREOP CHECKLIST - NSSDAENDDT_GEN_ALL_CORE
HH SOC with [facility].  Dr. Bacilio Larry.  SN, PT, OT, ST services.   31-Mar-2022 31-Mar-2022 07:27

## 2022-03-31 NOTE — ASU PREOPERATIVE ASSESSMENT, ADULT ([facility]ARK ONLY) - FALL HARM RISK - HARM RISK INTERVENTIONS

## 2022-04-14 LAB — SURGICAL PATHOLOGY STUDY: SIGNIFICANT CHANGE UP

## 2022-12-05 NOTE — ED ADULT NURSE NOTE - CAS TRG GENERAL AIRWAY, MLM
Call central scheduling at [phone] to schedule  Pulmonary function testing      You can schedule ENT appointment with one of the providers below      [facility]  [address] [address]  [place] [address]  Office: [phone]    [address] [address]  Get directions  Office: [phone]  Fax: [phone]    [facility] [address]  [address]  [place] [address]  Office: [phone]    
Patent

## 2023-10-24 NOTE — H&P PST ADULT - RS GEN PE MLT RESP DETAILS PC
Questionnaire reviewed with patient and all answers are within normal limits except Brooks did not take an antihistamine yet today. Cetirizine 10 mg/10 mL given per verbal order from Dr. Wood.      Product: Cetirizine 1 mg/ml  Lot Number: [id number]  MFG: [employer]  NDC: 93226-911-91  Expiration: 9/30/24  Time Given: 10:17 AM  Dose Given: 10 mL   Administered by Jennifer Lipscomb RN   Site Administered: PO    Allergy injections were administered and Brooks waited in the clinic waiting room for 30 minutes following administration and was discharged without adverse reactions noted.     See Allergy Immunotherapy for injection flow sheet for documentation of injection(s).    Dr. Wood was available in the clinic/office during this visit.    See Allergy Immunotherapy for injection flow sheet for documentation of injection(s)   
clear to auscultation bilaterally

## (undated) DEVICE — SPONGE DISSECTOR PEANUT

## (undated) DEVICE — DRAPE TOWEL BLUE 17" X 24"

## (undated) DEVICE — SUT PROLENE 2-0 30" CT-2

## (undated) DEVICE — DRSG BENZOIN 0.6CC

## (undated) DEVICE — GOWN XL

## (undated) DEVICE — GLV 7.5 PROTEXIS (WHITE)

## (undated) DEVICE — WARMING BLANKET FULL UNDERBODY

## (undated) DEVICE — DRAPE 3/4 SHEET 52X76"

## (undated) DEVICE — SUT VICRYL 3-0 27" SH UNDYED

## (undated) DEVICE — SUT VICRYL 2-0 18" TIES

## (undated) DEVICE — DRAPE LAPAROTOMY TRANSVERSE

## (undated) DEVICE — SUT MONOCRYL 4-0 27" PS-2 UNDYED

## (undated) DEVICE — DRSG STERISTRIPS 0.5 X 4"

## (undated) DEVICE — GLV 7.5 PROTEXIS (CREAM) NEU-THERA

## (undated) DEVICE — VENODYNE/SCD SLEEVE CALF MEDIUM

## (undated) DEVICE — ELCTR GROUNDING PAD ADULT COVIDIEN

## (undated) DEVICE — DRAIN PENROSE .25" X 12" SILICONE

## (undated) DEVICE — POSITIONER STRAP ARMBOARD VELCRO TS-30

## (undated) DEVICE — SUT VICRYL 2-0 27" SH UNDYED

## (undated) DEVICE — PACK MINOR NO DRAPE

## (undated) DEVICE — SOL IRR POUR NS 0.9% 500ML